# Patient Record
Sex: FEMALE
[De-identification: names, ages, dates, MRNs, and addresses within clinical notes are randomized per-mention and may not be internally consistent; named-entity substitution may affect disease eponyms.]

---

## 2022-05-16 ENCOUNTER — APPOINTMENT (OUTPATIENT)
Dept: ORTHOPEDIC SURGERY | Facility: CLINIC | Age: 22
End: 2022-05-16
Payer: COMMERCIAL

## 2022-05-16 VITALS — RESPIRATION RATE: 16 BRPM | BODY MASS INDEX: 18.12 KG/M2 | WEIGHT: 96 LBS | HEIGHT: 61 IN

## 2022-05-16 DIAGNOSIS — Z78.9 OTHER SPECIFIED HEALTH STATUS: ICD-10-CM

## 2022-05-16 DIAGNOSIS — Z82.62 FAMILY HISTORY OF OSTEOPOROSIS: ICD-10-CM

## 2022-05-16 PROBLEM — Z00.00 ENCOUNTER FOR PREVENTIVE HEALTH EXAMINATION: Status: ACTIVE | Noted: 2022-05-16

## 2022-05-16 PROCEDURE — 99204 OFFICE O/P NEW MOD 45 MIN: CPT

## 2022-05-16 PROCEDURE — 73110 X-RAY EXAM OF WRIST: CPT | Mod: RT

## 2022-05-16 NOTE — HISTORY OF PRESENT ILLNESS
[Right] : right hand dominant [FreeTextEntry1] : DOI-4/21/22\par 3 weeks 4 days patient presents with a left wrist injury sustained from a fall. She was treated at the ER where Xrays were done showing a Transverse Fracture of the distal radius. She was then treated by orthopedic Dr. Samayoa in [facility] who placed her in a removable splint. She was then treated by Dr. Marr a few days later who placed her in a custom splint. She complains of ulnar sided wrist pain when twisting.

## 2022-05-16 NOTE — PHYSICAL EXAM
[de-identified] : On examination today she is tender over the distal radius and ulnar styloid.  Mild swelling.  Digital extensors and EPL intact. [de-identified] : PA lateral oblique x-rays demonstrate a minimally displaced left distal radius fracture with 8 degrees of dorsal angulation and an ulnar styloid base fracture

## 2022-05-16 NOTE — ASSESSMENT
[FreeTextEntry1] : Patient suffered a minimally displaced distal radius fracture however there is 8 degrees of dorsal angulation.  She has been in a removable wrist splint.  Because it is already 8 degrees I recommend full-time thumb spica splinting for the remaining 2 weeks to try to minimize any additional displacement.  I would like to see her back in 2 weeks at which point hopefully we can discontinue the splint altogether and either start a home program or therapy.  Discussed the risk of late displacement and EPL ruptures

## 2022-05-16 NOTE — CONSULT LETTER
[Dear  ___] : Dear  [unfilled], [Consult Letter:] : I had the pleasure of evaluating your patient, [unfilled]. [Please see my note below.] : Please see my note below. [Consult Closing:] : Thank you very much for allowing me to participate in the care of this patient.  If you have any questions, please do not hesitate to contact me. [Sincerely,] : Sincerely, [FreeTextEntry3] : Andreas Dallas MD\par Co-Director\par [facility]\par

## 2022-05-25 ENCOUNTER — NON-APPOINTMENT (OUTPATIENT)
Age: 22
End: 2022-05-25

## 2022-05-25 ENCOUNTER — APPOINTMENT (OUTPATIENT)
Dept: ORTHOPEDIC SURGERY | Facility: CLINIC | Age: 22
End: 2022-05-25
Payer: COMMERCIAL

## 2022-05-25 VITALS — HEIGHT: 61 IN | BODY MASS INDEX: 18.12 KG/M2 | RESPIRATION RATE: 16 BRPM | WEIGHT: 96 LBS

## 2022-05-25 PROCEDURE — 73110 X-RAY EXAM OF WRIST: CPT | Mod: LT

## 2022-05-25 PROCEDURE — 99213 OFFICE O/P EST LOW 20 MIN: CPT

## 2022-06-06 ENCOUNTER — APPOINTMENT (OUTPATIENT)
Dept: ORTHOPEDIC SURGERY | Facility: CLINIC | Age: 22
End: 2022-06-06
Payer: COMMERCIAL

## 2022-06-22 ENCOUNTER — APPOINTMENT (OUTPATIENT)
Dept: ORTHOPEDIC SURGERY | Facility: CLINIC | Age: 22
End: 2022-06-22
Payer: COMMERCIAL

## 2022-06-22 VITALS — RESPIRATION RATE: 16 BRPM | HEIGHT: 61 IN | WEIGHT: 96 LBS | BODY MASS INDEX: 18.12 KG/M2

## 2022-06-22 DIAGNOSIS — S69.92XA UNSPECIFIED INJURY OF LEFT WRIST, HAND AND FINGER(S), INITIAL ENCOUNTER: ICD-10-CM

## 2022-06-22 PROCEDURE — 73110 X-RAY EXAM OF WRIST: CPT | Mod: LT

## 2022-06-22 PROCEDURE — 99213 OFFICE O/P EST LOW 20 MIN: CPT
